# Patient Record
Sex: FEMALE | Race: BLACK OR AFRICAN AMERICAN | NOT HISPANIC OR LATINO
[De-identification: names, ages, dates, MRNs, and addresses within clinical notes are randomized per-mention and may not be internally consistent; named-entity substitution may affect disease eponyms.]

---
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
## 2024-02-27 ENCOUNTER — OFFICE VISIT (OUTPATIENT)
Dept: FAMILY MEDICINE CLINIC | Facility: CLINIC | Age: 19
End: 2024-02-27
Payer: COMMERCIAL

## 2024-02-27 VITALS
DIASTOLIC BLOOD PRESSURE: 68 MMHG | WEIGHT: 151 LBS | TEMPERATURE: 98 F | HEART RATE: 79 BPM | OXYGEN SATURATION: 98 % | HEIGHT: 66 IN | BODY MASS INDEX: 24.27 KG/M2 | SYSTOLIC BLOOD PRESSURE: 118 MMHG | RESPIRATION RATE: 16 BRPM

## 2024-02-27 DIAGNOSIS — Z00.00 WELL FEMALE EXAM WITHOUT GYNECOLOGICAL EXAM: Primary | ICD-10-CM

## 2024-02-27 DIAGNOSIS — Z13.6 ENCOUNTER FOR LIPID SCREENING FOR CARDIOVASCULAR DISEASE: ICD-10-CM

## 2024-02-27 DIAGNOSIS — Z23 NEED FOR VACCINATION: ICD-10-CM

## 2024-02-27 DIAGNOSIS — Z13.228 SCREENING FOR ENDOCRINE, NUTRITIONAL, METABOLIC AND IMMUNITY DISORDER: ICD-10-CM

## 2024-02-27 DIAGNOSIS — Z13.0 SCREENING FOR ENDOCRINE, NUTRITIONAL, METABOLIC AND IMMUNITY DISORDER: ICD-10-CM

## 2024-02-27 DIAGNOSIS — Z13.220 ENCOUNTER FOR LIPID SCREENING FOR CARDIOVASCULAR DISEASE: ICD-10-CM

## 2024-02-27 DIAGNOSIS — Z13.21 SCREENING FOR ENDOCRINE, NUTRITIONAL, METABOLIC AND IMMUNITY DISORDER: ICD-10-CM

## 2024-02-27 DIAGNOSIS — Z11.3 ROUTINE SCREENING FOR STI (SEXUALLY TRANSMITTED INFECTION): ICD-10-CM

## 2024-02-27 DIAGNOSIS — Z13.29 SCREENING FOR ENDOCRINE, NUTRITIONAL, METABOLIC AND IMMUNITY DISORDER: ICD-10-CM

## 2024-02-27 PROBLEM — H66.93 ACUTE OTITIS MEDIA, BILATERAL: Status: RESOLVED | Noted: 2022-08-10 | Resolved: 2024-02-27

## 2024-02-27 PROBLEM — E66.3 OVERWEIGHT, PEDIATRIC, BMI 85.0-94.9 PERCENTILE FOR AGE: Status: RESOLVED | Noted: 2018-06-11 | Resolved: 2024-02-27

## 2024-02-27 PROBLEM — Z72.0 CURRENT EVERY DAY NICOTINE VAPING: Status: RESOLVED | Noted: 2021-09-23 | Resolved: 2024-02-27

## 2024-02-27 PROCEDURE — 87491 CHLMYD TRACH DNA AMP PROBE: CPT | Performed by: FAMILY MEDICINE

## 2024-02-27 PROCEDURE — 87591 N.GONORRHOEAE DNA AMP PROB: CPT | Performed by: FAMILY MEDICINE

## 2024-02-27 PROCEDURE — 99395 PREV VISIT EST AGE 18-39: CPT | Performed by: FAMILY MEDICINE

## 2024-02-27 RX ORDER — ATOMOXETINE 25 MG/1
25 CAPSULE ORAL EVERY MORNING
COMMUNITY
Start: 2023-12-19

## 2024-02-27 NOTE — PROGRESS NOTES
HPI:   Andra Becerra is a 18 year old female who presents for a complete physical exam.     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 Little interest or pleasure in doing things: Not at all  2. Feeling down, depressed, or hopeless: Not at all  3. Trouble falling or staying asleep, or sleeping too much: Not at all  4. Feeling tired or having little energy: More than half the days  5. Poor appetite or overeating: Not at all  6. Feeling bad about yourself - or that you are a failure or have let yourself or your family down: Not at all  7. Trouble concentrating on things, such as reading the newspaper or watching television: More than half the days  8. Moving or speaking so slowly that other people could have noticed. Or the opposite - being so fidgety or restless that you have been moving around a lot more than usual: Not at all  9. Thoughts that you would be better off dead, or of hurting yourself in some way: Not at all  PHQ-9 TOTAL SCORE: 4  If you checked off any problems, how difficult have these problems made it for you to do your work, take care of things at home, or get along with other people?: Somewhat difficult     Sleep Apnea  none no snoring   Exercise  3 times a week gym weights and cardio 2 hours   DIet  fluctuates overall better, rare for soda   Smoking history none  THC is tapering off use to use for anxiety   Alcohol history none  FH CAD or cancer  no CAD, no cancer  Symptoms: denies discharge, itching, burning or dysuria, periods are regular.  Sexually active with partner not always using condoms was not interested in birth control pills will consider Nexaplanon   Abnormal pap never had one   Last mammogram never   Sleep or emotional difficulty none  STD history none  Immunizations needed  HPV  Wt Readings from Last 6 Encounters:   02/27/24 151 lb (68.5 kg) (81%, Z= 0.87)*   04/12/23 150 lb (68 kg) (82%, Z= 0.91)*   01/05/23 156 lb (70.8 kg) (87%, Z= 1.10)*   08/10/22 146 lb (66.2 kg) (80%, Z= 0.85)*   09/22/21 168 lb (76.2 kg) (93%, Z= 1.48)*   04/02/21 179 lb (81.2 kg) (96%, Z= 1.71)*     * Growth percentiles are based on CDC (Girls, 2-20 Years) data.     Body mass index is 24.37 kg/m².       Results for orders placed or performed in visit on 09/22/21   Urine Preg Test   Result Value Ref Range    Pregnancy Test, Urine No     Control Line Present with a clear background (yes/no) Yes Yes/No    Kit Lot # [id number] Numeric    Kit Expiration Date 10- Date   SURESWAB(R), VAGINOSIS/VAGINITIS PLUS [74762][Q]   Result Value Ref Range    CHLAMYDIA TRACHOMATIS$RNA, TMA NOT DETECTED     NEISSERIA GONORRHOEAE$RNA, TMA NOT DETECTED     LACTOBACILLUS SPECIES 7.2 Log (cells/mL)    ATOPOBIUM VAGINAE NOT DETECTED Log (cells/mL)    MEGASPHAERA SPECIES NOT DETECTED Log (cells/mL)    GARDNERELLA VAGINALIS 6.0 Log (cells/mL)    BV RISK ASSESSMENT EQUIVOCAL (A)     SURESWAB(R) TRICHOMONAS$VAGINALIS RNA, QL TMA NOT DETECTED     C. ALBICANS, DNA DETECTED (A)     C. GLABRATA, DNA NOT DETECTED     C. TROPICALIS, DNA NOT DETECTED     C. PARAPSILOSIS, DNA NOT DETECTED         Current Outpatient Medications   Medication Sig Dispense Refill    atomoxetine 25 MG Oral Cap Take 1 capsule (25 mg total) by mouth every morning. (Patient not taking: Reported on 2/27/2024)        History reviewed. No pertinent past medical history.   History reviewed. No pertinent surgical history.   Family History   Problem Relation Age of Onset    High Blood Pressure Mother     High Blood Pressure Father     Diabetes Maternal Grandmother       Social History:   Social History     Socioeconomic History    Marital status: Single   Tobacco Use    Smoking status: Never    Smokeless tobacco: Never   Vaping Use    Vaping Use: Former    Start date:  4/1/2021    Quit date: 5/23/2022    Substances: Nicotine    Devices: Disposable   Substance and Sexual Activity    Alcohol use: No    Drug use: Yes     Frequency: 1.0 times per week     Types: Cannabis   Other Topics Concern     Service No    Blood Transfusions No    Caffeine Concern Yes     Comment: 0-1 iced coffee weekly    Occupational Exposure No    Hobby Hazards No    Sleep Concern No    Stress Concern No    Weight Concern No    Special Diet No    Back Care No    Exercise Yes    Bike Helmet No    Seat Belt Yes    Self-Exams No     Occ: Warehouse putting palates together.  : No .  Children: No.   Exercise: three times per week.  Diet: Varies     REVIEW OF SYSTEMS:   GENERAL: denies fevers, weakness, trouble sleeping or weight changes  SKIN: denies any unusual skin lesions or rashes  EYES:denies vision changes  HEENT: denies upper respiratory symptoms  LUNGS: denies cough or shortness of breath with exertion  CHEST:  denies breast changes or pain  CARDIOVASCULAR: denies chest pain or tightness on exertion: no edema  VASCULAR: denies leg cramps  GI: denies abdominal pain, bowel movement changes, blood in stool  : denies urinary problems, vaginal discharge or discomfort,  periods regular   MUSCULOSKELETAL: denies joint pain or stiffness  NEURO: denies headaches, tingling or dizziness  PSYCHE: denies depression or anxiety  HEMATOLOGIC: denies bleeding abnormalities  ENDOCRINE: denies temperature intolerance, polyuria, or excessive sweating.  LYMPHATICS: denies swollen glands      EXAM:   /68   Pulse 79   Temp 97.8 °F (36.6 °C) (Temporal)   Resp 16   Ht 5' 6\" (1.676 m)   Wt 151 lb (68.5 kg)   LMP 02/13/2024 (Exact Date)   SpO2 98%   BMI 24.37 kg/m²   Body mass index is 24.37 kg/m².   GENERAL: well developed, well nourished and in no apparent distress  SKIN: no rashes,no suspicious lesions  HEENT: atraumatic, normocephalic,ears, nose and throat are normal  EYES: PERRLA, EOMI, sclera,  conjunctiva are clear  NECK: supple,no adenopathy,no carotid bruits  CHEST: no chest tenderness  BREAST: symmetrical, no suspicious mass, no nipple dimpling or discharge.  LUNGS: clear to auscultation bilateral, no rales, rhonchi or wheezing  CARDIO: RRR without murmur normal S1S2  ABD:  normal bowel sounds,soft, non tender, no masses, HSM or tenderness  : Deferred  MUSCULOSKELETAL: gait justin,l no gross M/S defect.  EXTREMITIES: no clubbing, cyanosis, or edema  NEURO: oriented times three, cranial nerves are grossly intact, no gross motor or sensory deficit.    ASSESSMENT AND PLAN:   Andra Becerra is a 18 year old female who presents for a complete physical exam.    Encounter Diagnoses   Name Primary?    Well female exam without gynecological exam Yes    Need for vaccination     Screening for endocrine, nutritional, metabolic and immunity disorder     Encounter for lipid screening for cardiovascular disease     Routine screening for STI (sexually transmitted infection)        Orders Placed This Encounter   Procedures    TSH W REFLEX TO FREE T4 [12810][Q]    COMP METABOLIC PANEL [60323] [Q]    CBC [6399] [Q]    LIPID PANEL [7600] [Q]    Human Papillomavirus 9-valent vaccine, Recombinant (Gardasil 9) HPV 9 [83433]    Chlamydia/Gc Amplification [E]       Meds & Refills for this Visit:  Requested Prescriptions      No prescriptions requested or ordered in this encounter       Imaging & Consults:  HPV HUMAN PAPILLOMA VIRUS VACC 9 DEVON 3 DOSE IM  1. Well female exam without gynecological exam     .    Self breast exam explained. Health maintenance guidance given including vision and dental exams, vitamin D and calcium discussed.  Lifestyle guidance provided recommended low fat diet and aerobic exercise 30 minutes 3-4 times weekly.  Strongly encouraged regular use of condoms follow-up for birth control once she has decided discussed NuvaRing, Nexplanon, birth control pills, Depo-Provera patient is interested possibly in  the Nexplanon and wants to research first will call back to order it.  Did advise pregnancy is at increased risk with condoms only.    2. Need for vaccination  - Human Papillomavirus 9-valent vaccine, Recombinant (Gardasil 9) HPV 9 [71114]    3. Screening for endocrine, nutritional, metabolic and immunity disorder  - TSH W REFLEX TO FREE T4 [97786][Q]  - COMP METABOLIC PANEL [18986] [Q]  - CBC [6399] [Q]    4. Encounter for lipid screening for cardiovascular disease  - LIPID PANEL [7540] [Q]    5. Routine screening for STI (sexually transmitted infection)  - Chlamydia/Gc Amplification [E] done off of urine sample   The patient indicates understanding of these issues and agrees to the plan.  The patient is asked to return for complete physical yearly.    There are no Patient Instructions on file for this visit.  No follow-ups on file.

## 2024-02-28 ENCOUNTER — TELEPHONE (OUTPATIENT)
Dept: FAMILY MEDICINE CLINIC | Facility: CLINIC | Age: 19
End: 2024-02-28

## 2024-02-28 ENCOUNTER — PATIENT MESSAGE (OUTPATIENT)
Dept: FAMILY MEDICINE CLINIC | Facility: CLINIC | Age: 19
End: 2024-02-28

## 2024-02-28 LAB
C TRACH DNA SPEC QL NAA+PROBE: POSITIVE
N GONORRHOEA DNA SPEC QL NAA+PROBE: NEGATIVE

## 2024-02-28 RX ORDER — AZITHROMYCIN 250 MG
1000 TABLET ORAL ONCE
Qty: 4 TABLET | Refills: 0 | Status: SHIPPED | OUTPATIENT
Start: 2024-02-28 | End: 2024-02-28

## 2024-02-28 NOTE — PROGRESS NOTES
Chlamydia is positive a prescription for Zithromax 1 g total x1 day sent to your pharmacy.  Your partner would also need to be treated if they do not have a care provider a prescription can be provided for them.  Abstain from sexual activity for 7 days after you and partner have been treated with the antibiotics.  Follow-up in 3 months for repeat chlamydia testing.  Refer to CDC guidelines for further information on chlamydia.    Call her and review details of the Chlamydia and to see if partner needed to be treated.  Report chlamydia to health department

## 2024-02-29 NOTE — TELEPHONE ENCOUNTER
Chlamydia is positive a prescription for Zithromax (azithromycin) 1 g total x1 day sent to your pharmacy.  Your partner would also need to be treated if they do not have a care provider a prescription can be provided for them.  Abstain from sexual activity for 7 days after you and partner have been treated with the antibiotics.  Follow-up in 3 months for repeat chlamydia testing.  Refer to CDC guidelines for further information on chlamydia.     Sincerely,  Arabella Monroy PA-C   Written by Arabella Monroy PA-C on 2/28/2024  2:55 PM CST  Seen by patient Andra Becerra on 2/28/2024  7:41 PM

## 2024-02-29 NOTE — TELEPHONE ENCOUNTER
From: Andra Becerra  To: Arabella Monroy  Sent: 2/28/2024 6:55 PM CST  Subject: Prescription for partner?     Hello. I saw I was able to get a prescription for my partner, I was wondering if you could send a 2nd prescription for him too because he does not have a provider. Thank you!

## 2024-03-01 ENCOUNTER — TELEPHONE (OUTPATIENT)
Dept: FAMILY MEDICINE CLINIC | Facility: CLINIC | Age: 19
End: 2024-03-01

## 2024-03-01 NOTE — TELEPHONE ENCOUNTER
Cathy from Health Department called for an update on patients condition, ask when/what medication was prescribe to treat chlamydia, when patient needs to follow up with pcp, if partner also was treated.

## 2024-03-02 DIAGNOSIS — R79.89 ABNORMAL CBC: Primary | ICD-10-CM

## 2024-03-02 LAB
ABSOLUTE BASOPHILS: 42 CELLS/UL (ref 0–200)
ABSOLUTE EOSINOPHILS: 202 CELLS/UL (ref 15–500)
ABSOLUTE LYMPHOCYTES: 1174 CELLS/UL (ref 850–3900)
ABSOLUTE MONOCYTES: 333 CELLS/UL (ref 200–950)
ABSOLUTE NEUTROPHILS: 1450 CELLS/UL (ref 1500–7800)
ALBUMIN/GLOBULIN RATIO: 1.5 (CALC) (ref 1–2.5)
ALBUMIN: 4.4 G/DL (ref 3.6–5.1)
ALKALINE PHOSPHATASE: 75 U/L (ref 36–128)
ALT: 23 U/L (ref 5–32)
AST: 29 U/L (ref 12–32)
BASOPHILS: 1.3 %
BILIRUBIN, TOTAL: 0.8 MG/DL (ref 0.2–1.1)
BUN: 16 MG/DL (ref 7–20)
CALCIUM: 9.5 MG/DL (ref 8.9–10.4)
CARBON DIOXIDE: 26 MMOL/L (ref 20–32)
CHLORIDE: 103 MMOL/L (ref 98–110)
CHOL/HDLC RATIO: 2.4 (CALC)
CHOLESTEROL, TOTAL: 186 MG/DL
CREATININE: 0.81 MG/DL (ref 0.5–0.96)
EGFR: 107 ML/MIN/1.73M2
EOSINOPHILS: 6.3 %
GLOBULIN: 3 G/DL (CALC) (ref 2–3.8)
GLUCOSE: 66 MG/DL (ref 65–99)
HDL CHOLESTEROL: 76 MG/DL
HEMATOCRIT: 41.1 % (ref 35–45)
HEMOGLOBIN: 13.7 G/DL (ref 11.7–15.5)
LDL-CHOLESTEROL: 102 MG/DL (CALC)
LYMPHOCYTES: 36.7 %
MCH: 29.3 PG (ref 27–33)
MCHC: 33.3 G/DL (ref 32–36)
MCV: 88 FL (ref 80–100)
MONOCYTES: 10.4 %
MPV: 12 FL (ref 7.5–12.5)
NEUTROPHILS: 45.3 %
NON-HDL CHOLESTEROL: 110 MG/DL (CALC)
PLATELET COUNT: 225 THOUSAND/UL (ref 140–400)
POTASSIUM: 4.2 MMOL/L (ref 3.8–5.1)
PROTEIN, TOTAL: 7.4 G/DL (ref 6.3–8.2)
RDW: 11.8 % (ref 11–15)
RED BLOOD CELL COUNT: 4.67 MILLION/UL (ref 3.8–5.1)
SODIUM: 136 MMOL/L (ref 135–146)
TRIGLYCERIDES: 26 MG/DL
TSH W/REFLEX TO FT4: 0.65 MIU/L
WHITE BLOOD CELL COUNT: 3.2 THOUSAND/UL (ref 3.8–10.8)

## 2024-03-02 NOTE — PROGRESS NOTES
Low white blood cell count and elevated neutrophils repeat CBC in 1 month.  Cholesterol is elevated try to reduce saturated fats.  Thyroid testing is normal  Kidney function, liver function blood sugars are normal

## 2024-03-04 NOTE — TELEPHONE ENCOUNTER
Prescription for Zithromax 250 mg take 4 x 1 day  written patient can pick it up she can fill in the patient's name at the top of the prescription and fill it at the pharmacy      Abstain from sexual activity for 7 days after you and partner have been treated with the antibiotics.  Follow-up in 3 months for repeat chlamydia testing.  Refer to CDC guidelines for further information on chlamydia.

## 2024-05-21 ENCOUNTER — OFFICE VISIT (OUTPATIENT)
Dept: FAMILY MEDICINE CLINIC | Facility: CLINIC | Age: 19
End: 2024-05-21

## 2024-05-21 VITALS
HEART RATE: 84 BPM | WEIGHT: 159 LBS | HEIGHT: 66 IN | DIASTOLIC BLOOD PRESSURE: 74 MMHG | SYSTOLIC BLOOD PRESSURE: 116 MMHG | BODY MASS INDEX: 25.55 KG/M2 | OXYGEN SATURATION: 98 % | TEMPERATURE: 98 F | RESPIRATION RATE: 18 BRPM

## 2024-05-21 DIAGNOSIS — Z11.3 ROUTINE SCREENING FOR STI (SEXUALLY TRANSMITTED INFECTION): ICD-10-CM

## 2024-05-21 DIAGNOSIS — Z11.8 SCREENING FOR VIRAL AND CHLAMYDIAL DISEASES: ICD-10-CM

## 2024-05-21 DIAGNOSIS — Z86.19 HISTORY OF CHLAMYDIA: ICD-10-CM

## 2024-05-21 DIAGNOSIS — Z11.59 SCREENING FOR VIRAL AND CHLAMYDIAL DISEASES: ICD-10-CM

## 2024-05-21 DIAGNOSIS — Z30.41 ENCOUNTER FOR SURVEILLANCE OF CONTRACEPTIVE PILLS: Primary | ICD-10-CM

## 2024-05-21 PROCEDURE — 99214 OFFICE O/P EST MOD 30 MIN: CPT | Performed by: FAMILY MEDICINE

## 2024-05-21 PROCEDURE — 87591 N.GONORRHOEAE DNA AMP PROB: CPT | Performed by: FAMILY MEDICINE

## 2024-05-21 PROCEDURE — 87491 CHLMYD TRACH DNA AMP PROBE: CPT | Performed by: FAMILY MEDICINE

## 2024-05-21 PROCEDURE — 81514 NFCT DS BV&VAGINITIS DNA ALG: CPT | Performed by: FAMILY MEDICINE

## 2024-05-21 RX ORDER — TIMOLOL MALEATE 5 MG/ML
1 SOLUTION/ DROPS OPHTHALMIC DAILY
Qty: 84 TABLET | Refills: 3 | Status: SHIPPED | OUTPATIENT
Start: 2024-05-21 | End: 2024-08-19

## 2024-05-21 RX ORDER — TIMOLOL MALEATE 5 MG/ML
1 SOLUTION/ DROPS OPHTHALMIC DAILY
COMMUNITY
Start: 2024-04-30 | End: 2024-05-21

## 2024-05-21 NOTE — PROGRESS NOTES
Andra Becerra is a 19 year old female.  HPI:   Follow-up on chlamydia  24 foud out she was pregnant and week later at 5 weeks pregnant did a  pharmacological  has not light bleeding without pelvic or abdominal pain.    Sexually active with the same partner states she is using condoms now and was started on birth control pills prior she was going to do the Nexplanon but never followed through and had refused birth control pill.  2024 positive chlamydia treated with antibiotics partner also treated with antibiotics patient is here to have it rechecked  Denies any discharge, pelvic pain or abdominal pain    --- Birth control pills  Started 3 weeks ago also using condoms needs refill on prescription was [facility] by [facility].  No personal or family history of PE, DVT or hypercoagulability known.  Patient is a non-smoker.  No family history of breast cancer ovarian cancer.    Current Outpatient Medications   Medication Sig Dispense Refill    VIENVA 0.1-20 MG-MCG Oral Tab Take 1 tablet by mouth daily. 84 tablet 3    atomoxetine 25 MG Oral Cap Take 1 capsule (25 mg total) by mouth every morning. (Patient not taking: Reported on 2024)        History reviewed. No pertinent past medical history.   Social History:  Social History     Socioeconomic History    Marital status: Single   Tobacco Use    Smoking status: Never    Smokeless tobacco: Never   Vaping Use    Vaping status: Former    Start date: 2021    Quit date: 2022    Substances: Nicotine    Devices: Disposable   Substance and Sexual Activity    Alcohol use: No    Drug use: Yes     Frequency: 1.0 times per week     Types: Cannabis   Other Topics Concern     Service No    Blood Transfusions No    Caffeine Concern Yes     Comment: 0-1 iced coffee weekly    Occupational Exposure No    Hobby Hazards No    Sleep Concern No    Stress Concern No    Weight Concern No    Special Diet No    Back Care No    Exercise Yes    Bike Helmet No     Seat Belt Yes    Self-Exams No        REVIEW OF SYSTEMS:   GENERAL HEALTH: feels well otherwise  SKIN: denies any unusual skin lesions or rashes  RESPIRATORY: denies shortness of breath with exertion  CARDIOVASCULAR: denies chest pain on exertion  GI: denies abdominal pain and denies heartburn  NEURO: denies headaches  Musculoskeletal: No motor deficits   see above  EXAM:   /74   Pulse 84   Temp 98.1 °F (36.7 °C) (Temporal)   Resp 18   Ht 5' 6\" (1.676 m)   Wt 159 lb (72.1 kg)   LMP 03/16/2024 (Approximate)   SpO2 98%   BMI 25.66 kg/m²   GENERAL: well developed, well nourished,in no apparent distress  SKIN: no rashes,no suspicious lesions  NECK: supple,no adenopathy, thyroid normal to palpation  LUNGS: clear to auscultation no rales, rhonchi or wheezes  CARDIO: RRR without murmur  GI: Normal bowel sounds ×4 quadrant, no hepatosplenomegaly or masses, and no tenderness   :  normal external labia without lesions or erythema, introitus and vaginal vault normal with no lesions, cervix no erythema, no lesions, normal discharge.   No cervical motion tenderness and no pelvic mass appreciated.  No swab is available to do Chlamydia testing so chlamydia testing was done off of urine vaginitis panel obtained    EXTREMITIES: no cyanosis, clubbing or edema  Musculoskeletal: No gross deficit  Neurological: nerves II through XII grossly intact no sensorimotor deficit  Psychological: Mood and affect are normal.  Good communication skills.  ASSESSMENT AND PLAN:     Encounter Diagnoses   Name Primary?    Encounter for surveillance of contraceptive pills Yes    Routine screening for STI (sexually transmitted infection)     Screening for viral and chlamydial diseases     History of chlamydia        Orders Placed This Encounter   Procedures    RPR W/REF TO TITER & CONFIRM [44774][Q]    HIV-1/HIV2 ANTIBODIES [02652] [Q]    Hepatitis Panel, Acute (4) [E]    Chlamydia/Gc Amplification [E]    Vaginitis Vaginosis PCR Panel        Meds & Refills for this Visit:  Requested Prescriptions     Signed Prescriptions Disp Refills    VIENVA 0.1-20 MG-MCG Oral Tab 84 tablet 3     Sig: Take 1 tablet by mouth daily.       Imaging & Consults:  None  1. Encounter for surveillance of contraceptive pills  Use, risks and benefits of hormonal contraception discussed including blood clot that can go to the lungs, heart or brain and cause Heart Attack, Stroke and even Death. These risks are further increased with smoking. Do not start smoking. Patient verbalizes understanding.  Condoms needed    2. Routine screening for STI (sexually transmitted infection)  Condoms needed  - RPR W/REF TO TITER & CONFIRM [39550][Q]  - HIV-1/HIV2 ANTIBODIES [82857] [Q]  - Hepatitis Panel, Acute (4) [E]  - Chlamydia/Gc Amplification [E]  - Vaginitis Vaginosis PCR Panel; Future  - Vaginitis Vaginosis PCR Panel    3. Screening for viral and chlamydial diseases  History of chlamydia  - Chlamydia/Gc Amplification [E]  - Vaginitis Vaginosis PCR Panel; Future  - Vaginitis Vaginosis PCR Panel      The patient indicates understanding of these issues and agrees to the plan.  The patient is asked to return in as needed..     Detail Level: Simple

## 2024-05-22 ENCOUNTER — TELEPHONE (OUTPATIENT)
Dept: FAMILY MEDICINE CLINIC | Facility: CLINIC | Age: 19
End: 2024-05-22

## 2024-05-22 LAB
BV BACTERIA DNA VAG QL NAA+PROBE: POSITIVE
C GLABRATA DNA VAG QL NAA+PROBE: NEGATIVE
C KRUSEI DNA VAG QL NAA+PROBE: NEGATIVE
C TRACH DNA SPEC QL NAA+PROBE: POSITIVE
CANDIDA DNA VAG QL NAA+PROBE: NEGATIVE
N GONORRHOEA DNA SPEC QL NAA+PROBE: NEGATIVE
T VAGINALIS DNA VAG QL NAA+PROBE: NEGATIVE

## 2024-05-22 RX ORDER — METRONIDAZOLE 7.5 MG/G
1 GEL VAGINAL NIGHTLY
Qty: 1 EACH | Refills: 0 | Status: SHIPPED | OUTPATIENT
Start: 2024-05-22 | End: 2024-05-27

## 2024-05-22 RX ORDER — AZITHROMYCIN 500 MG/1
1000 TABLET, FILM COATED ORAL ONCE
Qty: 2 TABLET | Refills: 0 | Status: SHIPPED | OUTPATIENT
Start: 2024-05-22 | End: 2024-05-22

## 2024-05-22 NOTE — TELEPHONE ENCOUNTER
Spoke to patient and she is aware of the medications and results.  Her partner will need a written script for an antibiotic as well.  Please call her tomorrow when script is ready for             Left msg ernie Gonsalez RN/ health department with treatment sent in

## 2024-05-22 NOTE — PROGRESS NOTES
Chlamydia is positive again a prescription for Zithromax 1 g total x1 day will be sent to your pharmacy.  Your partner would also need to be treated if they do not have a care provider a prescription can be provided for them.  Abstain from sexual activity for 7 days after you and partner have been treated with the antibiotics.  Follow-up in 3 months for repeat chlamydia testing.  Refer to CDC guidelines for further information on chlamydia.  Reminder to use condoms when sexually active    Positive bacterial vaginosis. Start probiotic and use RX Metrogel vaginal gel one applicatorful once daily at bedtime for 5 days. Do not drink alcohol with medication.      Call her with results she needs to use condoms message sent to [employer] as well.

## 2024-05-22 NOTE — TELEPHONE ENCOUNTER
See message about chlamydia treatment for her and her partner ask if her partner needs treatment as well.  If needed prescription could be written without his name on it and left at the front for her to

## 2024-05-22 NOTE — TELEPHONE ENCOUNTER
----- Message from Arabella Monroy sent at 5/22/2024  3:56 PM CDT -----  Chlamydia is positive again a prescription for Zithromax 1 g total x1 day will be sent to your pharmacy.  Your partner would also need to be treated if they do not have a care provider a prescription can be provided for them.  Abstain from sexual activity for 7 days after you and partner have been treated with the antibiotics.  Follow-up in 3 months for repeat chlamydia testing.  Refer to CDC guidelines for further information on chlamydia.  Reminder to use condoms when sexually active    Positive bacterial vaginosis. Start probiotic and use RX Metrogel vaginal gel one applicatorful once daily at bedtime for 5 days. Do not drink alcohol with medication.      Call her with results she needs to use condoms message sent to [employer] as well.

## 2024-05-22 NOTE — TELEPHONE ENCOUNTER
Received call from Wallace brooks/ the [facility] requesting tx info for recent chlamydia diagnosis. Advised her that tx has not been prescribed yet. Will call her back w/ that info.

## 2024-06-03 ENCOUNTER — PATIENT MESSAGE (OUTPATIENT)
Dept: FAMILY MEDICINE CLINIC | Facility: CLINIC | Age: 19
End: 2024-06-03

## 2024-06-03 RX ORDER — AZITHROMYCIN 500 MG/1
1000 TABLET, FILM COATED ORAL ONCE
Qty: 2 TABLET | Refills: 0 | OUTPATIENT
Start: 2024-06-03 | End: 2024-06-03

## 2024-06-04 RX ORDER — AZITHROMYCIN 500 MG/1
1000 TABLET, FILM COATED ORAL ONCE
Qty: 2 TABLET | Refills: 0 | Status: SHIPPED | OUTPATIENT
Start: 2024-06-04 | End: 2024-06-04

## 2024-06-04 NOTE — TELEPHONE ENCOUNTER
From: Andra Becerra  To: Arabella Monroy  Sent: 6/3/2024 8:34 PM CDT  Subject: Requesting refill for antibiotic     Hello! I was wondering if I’m able to refill the prescription azithromycin 500 mg because I was reinfected before the 7 days and I would like to retake the pills without waiting for another appointment. Thank you!

## 2024-10-21 ENCOUNTER — OFFICE VISIT (OUTPATIENT)
Dept: FAMILY MEDICINE CLINIC | Facility: CLINIC | Age: 19
End: 2024-10-21
Payer: COMMERCIAL

## 2024-10-21 VITALS
OXYGEN SATURATION: 99 % | BODY MASS INDEX: 28.61 KG/M2 | HEART RATE: 73 BPM | WEIGHT: 178 LBS | DIASTOLIC BLOOD PRESSURE: 70 MMHG | HEIGHT: 66 IN | TEMPERATURE: 97 F | RESPIRATION RATE: 18 BRPM | SYSTOLIC BLOOD PRESSURE: 110 MMHG

## 2024-10-21 DIAGNOSIS — Z28.21 IMMUNIZATION DECLINED: ICD-10-CM

## 2024-10-21 DIAGNOSIS — B96.89 BACTERIAL VAGINOSIS: ICD-10-CM

## 2024-10-21 DIAGNOSIS — N76.0 BACTERIAL VAGINOSIS: ICD-10-CM

## 2024-10-21 DIAGNOSIS — Z11.3 SCREENING FOR STD (SEXUALLY TRANSMITTED DISEASE): Primary | ICD-10-CM

## 2024-10-21 PROCEDURE — 87801 DETECT AGNT MULT DNA AMPLI: CPT | Performed by: FAMILY MEDICINE

## 2024-10-21 PROCEDURE — 87491 CHLMYD TRACH DNA AMP PROBE: CPT | Performed by: FAMILY MEDICINE

## 2024-10-21 PROCEDURE — 81514 NFCT DS BV&VAGINITIS DNA ALG: CPT | Performed by: FAMILY MEDICINE

## 2024-10-21 PROCEDURE — 87591 N.GONORRHOEAE DNA AMP PROB: CPT | Performed by: FAMILY MEDICINE

## 2024-10-21 NOTE — PATIENT INSTRUCTIONS
Always use condoms when you have sex.    Consider getting your flu shot and the new COVID booster at your local pharmacy.    We will contact your with your test results.

## 2024-10-21 NOTE — PROGRESS NOTES
The 21st Century Cures Act makes medical notes like these available to patients in the interest of transparency. Please be advised this is a medical document. Medical documents are intended to carry relevant information, facts as evident, and the clinical opinion of the practitioner. The medical note is intended as peer to peer communication and may appear blunt or direct. It is written in medical language and may contain abbreviations or verbiage that are unfamiliar.       Andra Becerra is a 19 year old female.    HPI:     Chief Complaint   Patient presents with    Chlamydia       This 19-year-old female presents to the office with request for recheck for BV and chlamydia.  The patient was treated for chlamydia and BV in May 2024. The remainder of her STD testing was negative at that time. The patient has been sexually active without using any condoms.  She is on birth control.  She is not currently having any vaginal discharge, dysuria or pelvic pain.  She is here today because she wants to make sure the infection has cleared.    HISTORY:  Past Medical History:    Anxiety    Depression      No past surgical history on file.   Family History   Problem Relation Age of Onset    High Blood Pressure Mother     High Blood Pressure Father     Diabetes Maternal Grandmother       Social History:   Social History     Socioeconomic History    Marital status: Single   Tobacco Use    Smoking status: Never    Smokeless tobacco: Never   Vaping Use    Vaping status: Former    Start date: 4/1/2021    Quit date: 5/23/2022    Substances: Nicotine    Devices: Disposable   Substance and Sexual Activity    Alcohol use: No    Drug use: Yes     Frequency: 7.0 times per week     Types: Cannabis   Other Topics Concern     Service No    Blood Transfusions No    Caffeine Concern Yes    Occupational Exposure No    Hobby Hazards No    Sleep Concern No    Stress Concern No    Weight Concern No    Special Diet No    Back Care No     Exercise Yes    Bike Helmet No    Seat Belt Yes    Self-Exams No        Medications (Active prior to today's visit):  Current Outpatient Medications   Medication Sig Dispense Refill    atomoxetine 25 MG Oral Cap Take 1 capsule (25 mg total) by mouth every morning.      VIENVA 0.1-20 MG-MCG Oral Tab Take 1 tablet by mouth daily. 84 tablet 3       Allergies:  Allergies[1]    ROS:     Pertinent positives and pertinent negatives are as listed in HPI.    All other review of symptoms were reviewed and negative.    PHYSICAL EXAM:   /70 (BP Location: Left arm, Patient Position: Sitting, Cuff Size: adult)   Pulse 73   Temp 97.1 °F (36.2 °C)   Resp 18   Ht 5' 6\" (1.676 m)   Wt 178 lb (80.7 kg)   LMP 10/07/2024 (Approximate)   SpO2 99%   Breastfeeding No   BMI 28.73 kg/m²     Wt Readings from Last 3 Encounters:   10/21/24 178 lb (80.7 kg)   05/21/24 159 lb (72.1 kg)   02/27/24 151 lb (68.5 kg) (81%, Z= 0.87)*     * Growth percentiles are based on CDC (Girls, 2-20 Years) data.       BP Readings from Last 3 Encounters:   10/21/24 110/70   05/21/24 116/74   02/27/24 118/68       General: Well-nourished, well hydrated. No acute distress. No pallor.   HEENT: Normocephalic, atraumatic. Sclera clear and non icteric bilaterally.   Abdomen: Soft, nontender,  : External genitalia is normal.  Speculum exam is without any abnormal discharge.  The cervix without any lesions or discharge.  Bimanual exam shows no cervical motion tenderness, no adnexal masses or tenderness noted.  Extremities: No edema bilaterally.  Skin: Warm and dry.  Neuro: Alert and oriented x 3., normal gait.  Psych: Normal mood and affect.     ASSESSMENT/PLAN:   19 year old female with    LABS This Visit:    Results for orders placed or performed in visit on 05/21/24   Chlamydia/Gc Amplification [E]    Collection Time: 05/21/24 10:53 AM    Specimen: Urine; Other   Result Value Ref Range    Chlamydia Trachomatis Amplified RNA Positive (A) Negative     Neisseria Gonorrhoeae Amplified RNA Negative Negative    Chlam/GC Source Urine    Vaginitis Vaginosis PCR Panel    Collection Time: 05/21/24 10:53 AM    Specimen: Vaginal; Other   Result Value Ref Range    Bacterial Vaginosis Positive (A) Negative    Candida group Negative Negative    Nakaseomyces glabrata (Candida glabrata) Negative Negative    Pichia kudriavzevii (Candida krusei) Negative Negative    Trichomonas vaginalis Negative Negative   RPR W/REF TO TITER & CONFIRM [[id number]][Q]    Collection Time: 05/23/24  1:06 PM   Result Value Ref Range    RPR (DX) W/REFL TITER AND$CONFIRMATORY TESTING NON-REACTIVE NON-REACTIVE   HIV-1/HIV2 ANTIBODIES [[id number]] [Q]    Collection Time: 05/23/24  1:06 PM   Result Value Ref Range    HIV Antigen Antibody Combo NON-REACTIVE NON-REACTIVE   Hepatitis Panel, Acute (4) [E]    Collection Time: 05/23/24  1:06 PM   Result Value Ref Range    HEPATITIS A IGM NON-REACTIVE NON-REACTIVE    HEPATITIS B SURFACE$ANTIGEN NON-REACTIVE NON-REACTIVE    HEPATITIS B CORE$ANTIBODY (IGM) NON-REACTIVE NON-REACTIVE    HEPATITIS C ANTIBODY NON-REACTIVE NON-REACTIVE        1. Screening for STD (sexually transmitted disease)  2. Bacterial vaginosis    Swabs for gonorrhea/chlamydia and vaginitis panel were sent.  I emphasized the importance of safe sex practices with the patient.  The patient was advised that currently I do not see any evidence for infection but she will be contacted with the results and we will treat pending the results.    - Chlamydia/Gc Amplification [E]  - Vaginitis Vaginosis PCR Panel; Future    3. Immunization declined    Patient declined flu shot today.  I encouraged her to consider getting her flu shot and COVID booster at her local pharmacy.    - INFLUENZA REFUSED [facility]         Health Maintenance:    Health Maintenance   Topic Date Due    HPV Vaccines (2 - 3-dose series) 10/20/2021    COVID-19 Vaccine (3 - 2023-24 season) 10/21/2025    Influenza Vaccine (1) 06/30/2025    Annual Physical   02/27/2025    Chlamydia Screening  05/21/2025    DTaP,Tdap,and Td Vaccines (7 - Td or Tdap) 07/07/2025    Annual Depression Screening  Completed    Hepatitis B Vaccines  Completed    Hepatitis A Vaccines  Completed    MMR Vaccines  Completed    Varicella Vaccines  Completed    Meningococcal Vaccine  Completed    Pneumococcal Vaccine: Birth to 64yrs  Aged Out         Meds This Visit:  Requested Prescriptions      No prescriptions requested or ordered in this encounter       Imaging & Referrals:  INFLUENZA REFUSED [facility]     Patient understands plan and follow-up.  Follow up pending results.    10/21/2024  Ira Sullivan DO    This dictation was performed with a verbal recognition program (DRAGON) and it was checked for errors. It is possible that there are still dictated errors within this office note. If so, please bring any errors to my attention for an addendum. All efforts were made to ensure that this office note is accurate           [1] No Known Allergies     8

## 2024-10-22 LAB
BV BACTERIA DNA VAG QL NAA+PROBE: NEGATIVE
C GLABRATA DNA VAG QL NAA+PROBE: NEGATIVE
C KRUSEI DNA VAG QL NAA+PROBE: NEGATIVE
C TRACH DNA SPEC QL NAA+PROBE: NEGATIVE
CANDIDA DNA VAG QL NAA+PROBE: NEGATIVE
N GONORRHOEA DNA SPEC QL NAA+PROBE: NEGATIVE
T VAGINALIS DNA VAG QL NAA+PROBE: NEGATIVE

## 2025-08-04 ENCOUNTER — OFFICE VISIT (OUTPATIENT)
Dept: FAMILY MEDICINE CLINIC | Facility: CLINIC | Age: 20
End: 2025-08-04

## 2025-08-04 VITALS
SYSTOLIC BLOOD PRESSURE: 118 MMHG | HEIGHT: 65.59 IN | HEART RATE: 77 BPM | WEIGHT: 167 LBS | TEMPERATURE: 98 F | OXYGEN SATURATION: 98 % | BODY MASS INDEX: 27.16 KG/M2 | RESPIRATION RATE: 23 BRPM | DIASTOLIC BLOOD PRESSURE: 64 MMHG

## 2025-08-04 DIAGNOSIS — R26.89 ANTALGIC GAIT: ICD-10-CM

## 2025-08-04 DIAGNOSIS — M25.552 ACUTE HIP PAIN, LEFT: ICD-10-CM

## 2025-08-04 DIAGNOSIS — Z00.00 ROUTINE GENERAL MEDICAL EXAMINATION AT A HEALTH CARE FACILITY: Primary | ICD-10-CM

## 2025-08-04 RX ORDER — LISDEXAMFETAMINE DIMESYLATE 10 MG/1
1 CAPSULE ORAL EVERY MORNING
COMMUNITY
Start: 2025-07-30

## 2025-08-06 ENCOUNTER — HOSPITAL ENCOUNTER (OUTPATIENT)
Dept: GENERAL RADIOLOGY | Age: 20
Discharge: HOME | End: 2025-08-06
Attending: FAMILY MEDICINE

## 2025-08-06 DIAGNOSIS — M25.552 ACUTE HIP PAIN, LEFT: ICD-10-CM

## 2025-08-06 DIAGNOSIS — R26.89 ANTALGIC GAIT: ICD-10-CM

## 2025-08-06 PROCEDURE — 73502 X-RAY EXAM HIP UNI 2-3 VIEWS: CPT | Performed by: FAMILY MEDICINE

## 2025-08-13 ENCOUNTER — OFFICE VISIT (OUTPATIENT)
Facility: CLINIC | Age: 20
End: 2025-08-13

## 2025-08-13 ENCOUNTER — PATIENT MESSAGE (OUTPATIENT)
Facility: CLINIC | Age: 20
End: 2025-08-13

## 2025-08-13 VITALS — WEIGHT: 167 LBS | HEIGHT: 65.59 IN | BODY MASS INDEX: 27.16 KG/M2

## 2025-08-13 DIAGNOSIS — M24.159 DERANGEMENT OF HIP JOINT: Primary | ICD-10-CM

## 2025-08-13 PROCEDURE — 99204 OFFICE O/P NEW MOD 45 MIN: CPT | Performed by: FAMILY MEDICINE

## (undated) NOTE — MR AVS SNAPSHOT
After Visit Summary   5/21/2024    Andra Becerra   MRN: [id number]           Visit Information     Date & Time  5/21/2024 10:00 AM Provider  Arabella Monroy PA-C Department  [facility] Dept. Phone  [phone]      Your Vitals Were  Most recent update: 5/21/2024 10:16 AM    BP   116/74          Pulse   84          Temp   98.1 °F (36.7 °C) (Temporal)          Resp   18          Ht   66\"             Wt   159 lb    LMP   03/16/2024 (Approximate)    SpO2   98%    BMI   25.66 kg/m²         Allergies as of 5/21/2024  Review status set to Review Complete on 5/21/2024   No Known Allergies     Your Current Medications        Dosage    VIENVA 0.1-20 MG-MCG Oral Tab Take 1 tablet by mouth daily.    atomoxetine 25 MG Oral Cap Take 1 capsule (25 mg total) by mouth every morning.      Diagnoses for This Visit    Encounter for surveillance of contraceptive pills   [687628]  -  Primary  Routine screening for STI (sexually transmitted infection)   [509840]    Screening for viral and chlamydial diseases   [511955]    History of chlamydia   [572709]             We Ordered the Following     Normal Orders This Visit    Chlamydia/Gc Amplification [E] [8710260 CUSTOM]     Hepatitis Panel, Acute (4) [E] [7551502 CUSTOM]     HIV-1/HIV2 ANTIBODIES [88181] [Q] [EBR0334 CUSTOM]     RPR W/REF TO TITER & CONFIRM [64222][Q] [1672433 CUSTOM]     Vaginitis Vaginosis PCR Panel [[id number] CUSTOM]     Future Labs/Procedures Expected by Expires    Vaginitis Vaginosis PCR Panel [HNQ8755 CUSTOM]  5/21/2024 (Approximate) 5/21/2025                Did you know that [facility] primary care physicians now offer Video Visits through [employer] for adult patients for a variety of conditions such as allergies, back pain and cold symptoms? Skip the drive and waiting room and online chat with a doctor face-to-face using your web-cam enabled computer or mobile device wherever you are. Video Visits cost $50 and can be paid  hassle-free using a credit, debit, or health savings card.  Not active on [employer]? Ask us how to get signed up today!          If you receive a survey from [employer], please take a few minutes to complete it and provide feedback. We strive to deliver the best patient experience and are looking for ways to make improvements. Your feedback will help us do so. For more information on [employer], please visit [email or web address]           No text in SmartText           No text in SmartText